# Patient Record
Sex: FEMALE | Race: WHITE | URBAN - METROPOLITAN AREA
[De-identification: names, ages, dates, MRNs, and addresses within clinical notes are randomized per-mention and may not be internally consistent; named-entity substitution may affect disease eponyms.]

---

## 2021-03-17 ENCOUNTER — OFFICE VISIT (OUTPATIENT)
Dept: URBAN - METROPOLITAN AREA CLINIC 128 | Facility: CLINIC | Age: 31
End: 2021-03-17

## 2021-03-17 NOTE — HPI-OTHER HISTORIES
The patient elicits having abdominal pain. Location: Duration of symptoms: Associated symptoms: Severity/ Pain scale: Whatalleviates the symptoms: What aggravates the symptoms: Any recent weight changes: Any recent medication changes: Any recent dietary changes: Previous work-up- labs,imaging, scopes: LMP (for female patients only):

## 2021-04-13 ENCOUNTER — WEB ENCOUNTER (OUTPATIENT)
Dept: URBAN - METROPOLITAN AREA CLINIC 128 | Facility: CLINIC | Age: 31
End: 2021-04-13

## 2021-04-13 ENCOUNTER — DASHBOARD ENCOUNTERS (OUTPATIENT)
Age: 31
End: 2021-04-13

## 2021-04-13 ENCOUNTER — OFFICE VISIT (OUTPATIENT)
Dept: URBAN - METROPOLITAN AREA CLINIC 128 | Facility: CLINIC | Age: 31
End: 2021-04-13
Payer: COMMERCIAL

## 2021-04-13 DIAGNOSIS — Z87.898 HISTORY OF ALCOHOL USE: ICD-10-CM

## 2021-04-13 DIAGNOSIS — R19.7 DIARRHEA, UNSPECIFIED TYPE: ICD-10-CM

## 2021-04-13 DIAGNOSIS — N94.9 ADNEXAL CYST: ICD-10-CM

## 2021-04-13 DIAGNOSIS — Z83.79 FAMILY HISTORY OF ULCERATIVE COLITIS: ICD-10-CM

## 2021-04-13 DIAGNOSIS — R10.9 ABDOMINAL PAIN: ICD-10-CM

## 2021-04-13 PROBLEM — 97171000119100: Status: ACTIVE | Noted: 2021-04-13

## 2021-04-13 PROCEDURE — 99204 OFFICE O/P NEW MOD 45 MIN: CPT | Performed by: PHYSICIAN ASSISTANT

## 2021-04-13 RX ORDER — TRAZODONE HYDROCHLORIDE 150 MG/1
1 TABLET AT BEDTIME TABLET ORAL ONCE A DAY
Status: ACTIVE | COMMUNITY

## 2021-04-13 RX ORDER — GABAPENTIN 600 MG/1
1 TABLET TABLET, FILM COATED ORAL ONCE A DAY
Status: ACTIVE | COMMUNITY

## 2021-04-13 RX ORDER — CHOLESTYRAMINE 4 G/9G
1 PACKET MIXED WITH WATER OR NON-CARBONATED DRINK POWDER, FOR SUSPENSION ORAL TWICE A DAY
Qty: 60 | Refills: 2 | OUTPATIENT
Start: 2021-04-13

## 2021-04-13 RX ORDER — METHOCARBAMOL 750 MG/1
1 TABLET TABLET, FILM COATED ORAL
Status: ACTIVE | COMMUNITY

## 2021-04-13 RX ORDER — HYDROXYZINE PAMOATE 50 MG/1
1 CAPSULE AS NEEDED CAPSULE ORAL
Status: ACTIVE | COMMUNITY

## 2021-04-13 RX ORDER — HYOSCYAMINE SULFATE 0.12 MG/1
1 TABLET AS NEEDED TABLET ORAL
Qty: 30 | Refills: 0 | OUTPATIENT
Start: 2021-04-13 | End: 2021-05-13

## 2021-04-13 NOTE — HPI-OTHER HISTORIES
The patient elicits having RUQ abdominal pain and right flank pain Location: RUQ, right flank Duration of symptoms: 2 months Associated symptoms: diarrhea of 3-4 Bms a day that ar epost-prandial Severity/ Pain scale: moderate What alleviates the symptoms: nothing What aggravates the symptoms: nothing Any recent weight changes: none Any recent medication changes: none Any recent dietary changes: none Previous work-up- labs,imaging, scopes: She went to [facility] 2/18/21 for abdominal pain for a few months off and on. She quit drinking alcohol for 2 weeks prior. She elicitspreviously drinking alcohol daily but has been abstinent from all alcohol for 2 weeks now. She does Alcoholics Anonymous meetings  and has good support with her family. The patien has a h/o anxiety, PTSD, suicidal ideation and self injury and is currently under the care of a psychiatrist. She was told her lipase was 175 the day prior to going to the ED but in the ED it was normal at 37. Her WBC was 4.2. Her LFTs were normal. Her RUQ US revealed fatty liver and nonspecific scattered echogenic regions in the right kidney. Her abdominal and pelvic CT scan revealed a 3.2 cm cystic lesion in the left adnexa with thickened wall and a transvaginal US was recommended.  Her last colonoscopy was 5 years ago. Her mother had ulcerative colitis.

## 2021-04-15 ENCOUNTER — LAB OUTSIDE AN ENCOUNTER (OUTPATIENT)
Dept: URBAN - METROPOLITAN AREA CLINIC 128 | Facility: CLINIC | Age: 31
End: 2021-04-15

## 2021-04-16 ENCOUNTER — TELEPHONE ENCOUNTER (OUTPATIENT)
Dept: URBAN - METROPOLITAN AREA CLINIC 92 | Facility: CLINIC | Age: 31
End: 2021-04-16

## 2021-04-16 ENCOUNTER — LAB OUTSIDE AN ENCOUNTER (OUTPATIENT)
Dept: URBAN - METROPOLITAN AREA CLINIC 128 | Facility: CLINIC | Age: 31
End: 2021-04-16

## 2021-04-27 ENCOUNTER — OFFICE VISIT (OUTPATIENT)
Dept: URBAN - METROPOLITAN AREA SURGERY CENTER 31 | Facility: SURGERY CENTER | Age: 31
End: 2021-04-27

## 2021-04-27 ENCOUNTER — TELEPHONE ENCOUNTER (OUTPATIENT)
Dept: URBAN - METROPOLITAN AREA CLINIC 92 | Facility: CLINIC | Age: 31
End: 2021-04-27

## 2021-04-28 ENCOUNTER — CLAIMS CREATED FROM THE CLAIM WINDOW (OUTPATIENT)
Dept: URBAN - METROPOLITAN AREA CLINIC 4 | Facility: CLINIC | Age: 31
End: 2021-04-28
Payer: COMMERCIAL

## 2021-04-28 ENCOUNTER — OFFICE VISIT (OUTPATIENT)
Dept: URBAN - METROPOLITAN AREA SURGERY CENTER 31 | Facility: SURGERY CENTER | Age: 31
End: 2021-04-28
Payer: COMMERCIAL

## 2021-04-28 DIAGNOSIS — K63.89 STENOSIS OF ILEOCECAL VALVE: ICD-10-CM

## 2021-04-28 DIAGNOSIS — R19.7 ACUTE DIARRHEA: ICD-10-CM

## 2021-04-28 DIAGNOSIS — R10.84 ABDOMINAL CRAMPING, GENERALIZED: ICD-10-CM

## 2021-04-28 LAB
A/G RATIO: (no result)
ALBUMIN: (no result)
ALKALINE PHOSPHATASE: (no result)
ALT (SGPT): (no result)
AST (SGOT): (no result)
BASO (ABSOLUTE): (no result)
BASOS: (no result)
BILIRUBIN, TOTAL: (no result)
BUN/CREATININE RATIO: (no result)
BUN: (no result)
C DIFFICILE TOXINS A+B, EIA: NEGATIVE
CALCIUM: (no result)
CAMPYLOBACTER CULTURE: (no result)
CARBON DIOXIDE, TOTAL: (no result)
CHLORIDE: (no result)
CREATININE: (no result)
DEAMIDATED GLIADIN ABS, IGA: (no result)
DEAMIDATED GLIADIN ABS, IGG: (no result)
E COLI SHIGA TOXIN EIA: (no result)
EGFR IF AFRICN AM: (no result)
EGFR IF NONAFRICN AM: (no result)
ENDOMYSIAL ANTIBODY IGA: (no result)
EOS (ABSOLUTE): (no result)
EOS: (no result)
FATS, NEUTRAL: NORMAL
FATS, TOTAL: NORMAL
GIARDIA LAMBLIA AG, EIA: NEGATIVE
GLOBULIN, TOTAL: (no result)
GLUCOSE: (no result)
HEMATOCRIT: (no result)
HEMATOLOGY COMMENTS:: (no result)
HEMOGLOBIN: (no result)
IMMATURE CELLS: (no result)
IMMATURE GRANS (ABS): (no result)
IMMATURE GRANULOCYTES: (no result)
IMMUNOGLOBULIN A, QN, SERUM: (no result)
LIPASE: (no result)
LYMPHS (ABSOLUTE): (no result)
LYMPHS: (no result)
MCH: (no result)
MCHC: (no result)
MCV: (no result)
MONOCYTES(ABSOLUTE): (no result)
MONOCYTES: (no result)
NEUTROPHILS (ABSOLUTE): (no result)
NEUTROPHILS: (no result)
NRBC: (no result)
OVA + PARASITE EXAM: (no result)
PANCREATIC ELASTASE, FECAL: >500
PLATELETS: (no result)
POTASSIUM: (no result)
PROTEIN, TOTAL: (no result)
RBC: (no result)
RDW: (no result)
REQUEST PROBLEM: (no result)
REQUEST PROBLEM: (no result)
SALMONELLA/SHIGELLA SCREEN: (no result)
SODIUM: (no result)
T-TRANSGLUTAMINASE (TTG) IGA: (no result)
T-TRANSGLUTAMINASE (TTG) IGG: (no result)
TSH: (no result)
WBC: (no result)
WHITE BLOOD CELLS (WBC), STOOL: (no result)

## 2021-04-28 PROCEDURE — 88313 SPECIAL STAINS GROUP 2: CPT | Performed by: PATHOLOGY

## 2021-04-28 PROCEDURE — 88305 TISSUE EXAM BY PATHOLOGIST: CPT | Performed by: PATHOLOGY

## 2021-04-28 PROCEDURE — 88342 IMHCHEM/IMCYTCHM 1ST ANTB: CPT | Performed by: PATHOLOGY

## 2021-04-28 PROCEDURE — 45380 COLONOSCOPY AND BIOPSY: CPT | Performed by: INTERNAL MEDICINE

## 2021-04-28 PROCEDURE — G8907 PT DOC NO EVENTS ON DISCHARG: HCPCS | Performed by: INTERNAL MEDICINE

## 2021-04-30 LAB
A/G RATIO: 1.6
ABSOLUTE BASOPHILS: 19
ABSOLUTE EOSINOPHILS: 110
ABSOLUTE LYMPHOCYTES: 836
ABSOLUTE MONOCYTES: 369
ABSOLUTE NEUTROPHILS: 2466
ALBUMIN: 3.9
ALKALINE PHOSPHATASE: 79
ALT (SGPT): 9
AST (SGOT): 12
BASOPHILS: 0.5
BILIRUBIN, TOTAL: 0.3
BUN/CREATININE RATIO: (no result)
BUN: 22
CALCIUM: 8.8
CARBON DIOXIDE, TOTAL: 23
CHLORIDE: 106
CREATININE: 0.8
EGFR AFRICAN AMERICAN: 114
EGFR NON-AFR. AMERICAN: 98
EOSINOPHILS: 2.9
GLOBULIN, TOTAL: 2.4
GLUCOSE: 95
HEMATOCRIT: 40.2
HEMOGLOBIN: 14.1
IMMUNOGLOBULIN A: 173
INTERPRETATION: (no result)
LIPASE: 27
LYMPHOCYTES: 22
MCH: 31.3
MCHC: 35.1
MCV: 89.1
MONOCYTES: 9.7
MPV: 11.5
NEUTROPHILS: 64.9
PLATELET COUNT: 169
POTASSIUM: 3.8
PROTEIN, TOTAL: 6.3
RDW: 11.8
RED BLOOD CELL COUNT: 4.51
SODIUM: 140
TISSUE TRANSGLUTAMINASE AB, IGA: 1
TSH: 1.24
WHITE BLOOD CELL COUNT: 3.8

## 2021-05-03 ENCOUNTER — WEB ENCOUNTER (OUTPATIENT)
Dept: URBAN - METROPOLITAN AREA CLINIC 128 | Facility: CLINIC | Age: 31
End: 2021-05-03

## 2021-05-06 ENCOUNTER — LAB OUTSIDE AN ENCOUNTER (OUTPATIENT)
Dept: URBAN - METROPOLITAN AREA CLINIC 118 | Facility: CLINIC | Age: 31
End: 2021-05-06

## 2025-03-18 NOTE — PHYSICAL EXAM NECK/THYROID:
Palliative Care Department  [phone]  Palliative Care Progress Note  Provider Mamie Melgoza PA-C    Prasanna Parnell  [id number]  Hospital Day: 16  Date of Initial Consult: 3/3/2025  Referring Provider: Tammie Higginbotham DO  Palliative Medicine was consulted for assistance with: \"SCC\"    HPI:   Prasanna Parnell is a 56 y.o. with a past medical history of spondylolisthesis, retrolisthesis, neuropathy, COPD, squamous cell carcinoma head and neck, tobacco use who was admitted on 3/3/2025 from radiation office with a CHIEF COMPLAINT of poor oral intake and failure to thrive.  Patient was diagnosed with squamous cell carcinoma of the head and neck in December 2024.  Laryngoscopy by ENT 1/14/2025 showed ulcerated masses seen in the area of the right palatine tonsil extending to the base of the tongue into the vallecula approaching midline.  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  He had a bronchoscopy with fine-needle aspiration of right upper lobe lesion which was positive for malignancy consistent with squamous cell carcinoma.  Patient was initiated on chemotherapy and is following with Dr. Tomas in medical oncology and radiation therapy concurrently.  He underwent PEG tube placement. Patient is actively following with the palliative medicine clinic for symptom management.    He was being seen and radiation oncology office and was found to have very poor oral intake over a week.  His insurance reportedly denied his tube feedings for his PEG tube.  He has also been experiencing uncontrolled nausea and vomiting.  He was admitted to the hospital for further management.  Oncology and radiation oncology have been consulted.  Palliative care was consulted.    3/11: PEG to J tube  3/16: PICC inserted  3/17: TPN started  ASSESSMENT/PLAN:     Pertinent Hospital Diagnoses     Metastatic squamous cell carcinoma right base of the  normal appearance , without tenderness upon palpation